# Patient Record
Sex: FEMALE | Race: WHITE | NOT HISPANIC OR LATINO | Employment: OTHER | URBAN - METROPOLITAN AREA
[De-identification: names, ages, dates, MRNs, and addresses within clinical notes are randomized per-mention and may not be internally consistent; named-entity substitution may affect disease eponyms.]

---

## 2018-05-30 ENCOUNTER — OFFICE VISIT (OUTPATIENT)
Dept: URGENT CARE | Facility: CLINIC | Age: 38
End: 2018-05-30
Payer: COMMERCIAL

## 2018-05-30 VITALS
RESPIRATION RATE: 16 BRPM | HEART RATE: 110 BPM | OXYGEN SATURATION: 98 % | SYSTOLIC BLOOD PRESSURE: 106 MMHG | TEMPERATURE: 97.6 F | DIASTOLIC BLOOD PRESSURE: 78 MMHG

## 2018-05-30 DIAGNOSIS — M54.9 DORSALGIA: ICD-10-CM

## 2018-05-30 DIAGNOSIS — R51.9 NONINTRACTABLE HEADACHE, UNSPECIFIED CHRONICITY PATTERN, UNSPECIFIED HEADACHE TYPE: ICD-10-CM

## 2018-05-30 DIAGNOSIS — N30.90 BLADDER INFECTION: ICD-10-CM

## 2018-05-30 LAB
APPEARANCE UR: NORMAL
BILIRUB UR STRIP-MCNC: NEGATIVE MG/DL
COLOR UR AUTO: YELLOW
GLUCOSE UR STRIP.AUTO-MCNC: NEGATIVE MG/DL
KETONES UR STRIP.AUTO-MCNC: NEGATIVE MG/DL
LEUKOCYTE ESTERASE UR QL STRIP.AUTO: NORMAL
NITRITE UR QL STRIP.AUTO: NEGATIVE
PH UR STRIP.AUTO: 7 [PH] (ref 5–8)
PROT UR QL STRIP: NEGATIVE MG/DL
RBC UR QL AUTO: NEGATIVE
SP GR UR STRIP.AUTO: 1.01
UROBILINOGEN UR STRIP-MCNC: NEGATIVE MG/DL

## 2018-05-30 PROCEDURE — 81002 URINALYSIS NONAUTO W/O SCOPE: CPT | Performed by: FAMILY MEDICINE

## 2018-05-30 PROCEDURE — 99000 SPECIMEN HANDLING OFFICE-LAB: CPT | Performed by: FAMILY MEDICINE

## 2018-05-30 PROCEDURE — 99214 OFFICE O/P EST MOD 30 MIN: CPT | Performed by: FAMILY MEDICINE

## 2018-05-30 RX ORDER — CEPHALEXIN 500 MG/1
500 CAPSULE ORAL 3 TIMES DAILY
Qty: 21 CAP | Refills: 0 | Status: SHIPPED | OUTPATIENT
Start: 2018-05-30 | End: 2018-06-06

## 2018-05-30 ASSESSMENT — ENCOUNTER SYMPTOMS
MYALGIAS: 1
CHILLS: 0
HEADACHES: 1
FOCAL WEAKNESS: 0
SPUTUM PRODUCTION: 0
FEVER: 0
COUGH: 0
DIZZINESS: 0
BACK PAIN: 1

## 2018-05-30 NOTE — PROGRESS NOTES
"Subjective:      Gabrielle Courtney is a 37 y.o. female who presents with Back Pain (x yesterday, pain on Rt. lower back, pain is constant and getting worse)    Chief Complaint   Patient presents with   • Back Pain     x yesterday, pain on Rt. lower back, pain is constant and getting worse        - This is a very pleasant 37 y.o. female with complaints of atraumatic constant non-radiating afebrile Rt lower back pain since yesterday. Cant think of anything that makes it better or worse. No NVFC, no rash, no limb weakness. Eating/drinking well, no urinary symptoms.     ~36 wks pregnant, + FM, no LOF/VB.           ALLERGIES:  Patient has no known allergies.     PMH:  Past Medical History:   Diagnosis Date   • Anesthesia     PONV \"always require higher dose\"   • Arthritis    • ASTHMA    • Other specified disorder of intestines     IBS   • Pain     left knee   • Personal history of venous thrombosis and embolism 2006   • Psychiatric problem     depression, anxiety   • Renal disorder     hx frequent kidney infections   • Urinary bladder disorder     frequent UTI        MEDS:    Current Outpatient Prescriptions:   •  Prenatal Vit-Fe Fumarate-FA (PRENATAL VITAMIN PO), Take  by mouth., Disp: , Rfl:   •  cephALEXin (KEFLEX) 500 MG Cap, Take 1 Cap by mouth 3 times a day for 7 days., Disp: 21 Cap, Rfl: 0  •  Butalbital-Aspirin-Caffeine (FIORINAL PO), Take  by mouth as needed., Disp: , Rfl:   •  Probiotic Product (ALIGN PO), Take  by mouth every day., Disp: , Rfl:     ** I have documented what I find to be significant in regards to past medical, social, family and surgical history  in my HPI or under PMH/PSH/FH review section, otherwise it is contributory **           HPI    Review of Systems   Constitutional: Negative for chills and fever.   Respiratory: Negative for cough and sputum production.    Musculoskeletal: Positive for back pain and myalgias.   Neurological: Positive for headaches. Negative for dizziness and focal " weakness.          Objective:     /78   Pulse (!) 110   Temp 36.4 °C (97.6 °F)   Resp 16   SpO2 98%      Physical Exam   Constitutional: She appears well-developed. No distress.   HENT:   Head: Normocephalic and atraumatic.   Mouth/Throat: Oropharynx is clear and moist.   Eyes: Conjunctivae are normal.   Neck: Neck supple.   Cardiovascular: Regular rhythm.    No murmur heard.  Pulmonary/Chest: Effort normal. No respiratory distress.   Musculoskeletal:        Arms:  Neurological: She is alert. She exhibits normal muscle tone.   Skin: Skin is warm and dry.   Psychiatric: She has a normal mood and affect. Judgment normal.   Nursing note and vitals reviewed.  Rt paraspinal region w/ TTP.             Assessment/Plan:         1. Headache     2. Dorsalgia  POCT Urinalysis    URINE CULTURE(NEW)   3. Bladder infection  cephALEXin (KEFLEX) 500 MG Cap       * suspect this is more MSK then a UTI/Pyelo. Will cover w/ abx and have asked her to f/u w/ her OB tomorrow morning, ER if getting worse.       Dx & d/c instructions discussed w/ patient and/or family members. Follow up w/ her OB in morning,  ER if worse and UC/PCP unavailable.        Possible side effects (i.e. Rash, GI upset/constipation, sedation, elevation of BP or sugars) of any medications given discussed.

## 2018-06-22 ENCOUNTER — HOSPITAL ENCOUNTER (INPATIENT)
Facility: MEDICAL CENTER | Age: 38
LOS: 2 days | End: 2018-06-24
Attending: OBSTETRICS & GYNECOLOGY | Admitting: OBSTETRICS & GYNECOLOGY
Payer: COMMERCIAL

## 2018-06-22 LAB
BASOPHILS # BLD AUTO: 0.2 % (ref 0–1.8)
BASOPHILS # BLD: 0.02 K/UL (ref 0–0.12)
EOSINOPHIL # BLD AUTO: 0.09 K/UL (ref 0–0.51)
EOSINOPHIL NFR BLD: 0.8 % (ref 0–6.9)
ERYTHROCYTE [DISTWIDTH] IN BLOOD BY AUTOMATED COUNT: 43.8 FL (ref 35.9–50)
HCT VFR BLD AUTO: 36.1 % (ref 37–47)
HGB BLD-MCNC: 11.5 G/DL (ref 12–16)
HOLDING TUBE BB 8507: NORMAL
IMM GRANULOCYTES # BLD AUTO: 0.1 K/UL (ref 0–0.11)
IMM GRANULOCYTES NFR BLD AUTO: 0.8 % (ref 0–0.9)
LYMPHOCYTES # BLD AUTO: 1.77 K/UL (ref 1–4.8)
LYMPHOCYTES NFR BLD: 14.9 % (ref 22–41)
MCH RBC QN AUTO: 25.2 PG (ref 27–33)
MCHC RBC AUTO-ENTMCNC: 31.9 G/DL (ref 33.6–35)
MCV RBC AUTO: 79.2 FL (ref 81.4–97.8)
MONOCYTES # BLD AUTO: 0.84 K/UL (ref 0–0.85)
MONOCYTES NFR BLD AUTO: 7.1 % (ref 0–13.4)
NEUTROPHILS # BLD AUTO: 9.03 K/UL (ref 2–7.15)
NEUTROPHILS NFR BLD: 76.2 % (ref 44–72)
NRBC # BLD AUTO: 0 K/UL
NRBC BLD-RTO: 0 /100 WBC
PLATELET # BLD AUTO: 192 K/UL (ref 164–446)
PMV BLD AUTO: 11.6 FL (ref 9–12.9)
RBC # BLD AUTO: 4.56 M/UL (ref 4.2–5.4)
WBC # BLD AUTO: 11.9 K/UL (ref 4.8–10.8)

## 2018-06-22 PROCEDURE — 700102 HCHG RX REV CODE 250 W/ 637 OVERRIDE(OP): Performed by: OBSTETRICS & GYNECOLOGY

## 2018-06-22 PROCEDURE — 0HQ9XZZ REPAIR PERINEUM SKIN, EXTERNAL APPROACH: ICD-10-PCS | Performed by: OBSTETRICS & GYNECOLOGY

## 2018-06-22 PROCEDURE — 700101 HCHG RX REV CODE 250

## 2018-06-22 PROCEDURE — 302128 INFUSION PUMP: Performed by: OBSTETRICS & GYNECOLOGY

## 2018-06-22 PROCEDURE — 700111 HCHG RX REV CODE 636 W/ 250 OVERRIDE (IP)

## 2018-06-22 PROCEDURE — 3E0R3GC INTRODUCTION OF OTHER THERAPEUTIC SUBSTANCE INTO SPINAL CANAL, PERCUTANEOUS APPROACH: ICD-10-PCS | Performed by: ANESTHESIOLOGY

## 2018-06-22 PROCEDURE — 3E0T3BZ INTRODUCTION OF ANESTHETIC AGENT INTO PERIPHERAL NERVES AND PLEXI, PERCUTANEOUS APPROACH: ICD-10-PCS | Performed by: ANESTHESIOLOGY

## 2018-06-22 PROCEDURE — 36415 COLL VENOUS BLD VENIPUNCTURE: CPT

## 2018-06-22 PROCEDURE — 85025 COMPLETE CBC W/AUTO DIFF WBC: CPT

## 2018-06-22 PROCEDURE — 770001 HCHG ROOM/CARE - MED/SURG/GYN PRIV*

## 2018-06-22 PROCEDURE — 303615 HCHG EPIDURAL/SPINAL ANESTHESIA FOR LABOR

## 2018-06-22 PROCEDURE — 700112 HCHG RX REV CODE 229: Performed by: OBSTETRICS & GYNECOLOGY

## 2018-06-22 PROCEDURE — A9270 NON-COVERED ITEM OR SERVICE: HCPCS | Performed by: OBSTETRICS & GYNECOLOGY

## 2018-06-22 PROCEDURE — 700111 HCHG RX REV CODE 636 W/ 250 OVERRIDE (IP): Performed by: OBSTETRICS & GYNECOLOGY

## 2018-06-22 PROCEDURE — 59409 OBSTETRICAL CARE: CPT

## 2018-06-22 PROCEDURE — 700105 HCHG RX REV CODE 258: Performed by: OBSTETRICS & GYNECOLOGY

## 2018-06-22 PROCEDURE — 304965 HCHG RECOVERY SERVICES

## 2018-06-22 RX ORDER — MAG HYDROX/ALUMINUM HYD/SIMETH 400-400-40
1 SUSPENSION, ORAL (FINAL DOSE FORM) ORAL
Status: DISCONTINUED | OUTPATIENT
Start: 2018-06-22 | End: 2018-06-24 | Stop reason: HOSPADM

## 2018-06-22 RX ORDER — SODIUM CHLORIDE, SODIUM LACTATE, POTASSIUM CHLORIDE, CALCIUM CHLORIDE 600; 310; 30; 20 MG/100ML; MG/100ML; MG/100ML; MG/100ML
INJECTION, SOLUTION INTRAVENOUS CONTINUOUS
Status: DISPENSED | OUTPATIENT
Start: 2018-06-22 | End: 2018-06-22

## 2018-06-22 RX ORDER — ROPIVACAINE HYDROCHLORIDE 2 MG/ML
INJECTION, SOLUTION EPIDURAL; INFILTRATION; PERINEURAL
Status: COMPLETED
Start: 2018-06-22 | End: 2018-06-22

## 2018-06-22 RX ORDER — VITAMIN A ACETATE, BETA CAROTENE, ASCORBIC ACID, CHOLECALCIFEROL, .ALPHA.-TOCOPHEROL ACETATE, DL-, THIAMINE MONONITRATE, RIBOFLAVIN, NIACINAMIDE, PYRIDOXINE HYDROCHLORIDE, FOLIC ACID, CYANOCOBALAMIN, CALCIUM CARBONATE, FERROUS FUMARATE, ZINC OXIDE, CUPRIC OXIDE 3080; 12; 120; 400; 1; 1.84; 3; 20; 22; 920; 25; 200; 27; 10; 2 [IU]/1; UG/1; MG/1; [IU]/1; MG/1; MG/1; MG/1; MG/1; MG/1; [IU]/1; MG/1; MG/1; MG/1; MG/1; MG/1
1 TABLET, FILM COATED ORAL EVERY MORNING
Status: DISCONTINUED | OUTPATIENT
Start: 2018-06-23 | End: 2018-06-24 | Stop reason: HOSPADM

## 2018-06-22 RX ORDER — ONDANSETRON 4 MG/1
4 TABLET, ORALLY DISINTEGRATING ORAL EVERY 6 HOURS PRN
Status: DISCONTINUED | OUTPATIENT
Start: 2018-06-22 | End: 2018-06-24 | Stop reason: HOSPADM

## 2018-06-22 RX ORDER — DOCUSATE SODIUM 100 MG/1
100 CAPSULE, LIQUID FILLED ORAL 2 TIMES DAILY PRN
Status: DISCONTINUED | OUTPATIENT
Start: 2018-06-22 | End: 2018-06-24 | Stop reason: HOSPADM

## 2018-06-22 RX ORDER — DEXTROSE, SODIUM CHLORIDE, SODIUM LACTATE, POTASSIUM CHLORIDE, AND CALCIUM CHLORIDE 5; .6; .31; .03; .02 G/100ML; G/100ML; G/100ML; G/100ML; G/100ML
INJECTION, SOLUTION INTRAVENOUS CONTINUOUS
Status: DISCONTINUED | OUTPATIENT
Start: 2018-06-22 | End: 2018-06-22 | Stop reason: HOSPADM

## 2018-06-22 RX ORDER — METHYLERGONOVINE MALEATE 0.2 MG/ML
0.2 INJECTION INTRAVENOUS
Status: DISCONTINUED | OUTPATIENT
Start: 2018-06-22 | End: 2018-06-24 | Stop reason: HOSPADM

## 2018-06-22 RX ORDER — CITRIC ACID/SODIUM CITRATE 334-500MG
30 SOLUTION, ORAL ORAL EVERY 6 HOURS PRN
Status: DISCONTINUED | OUTPATIENT
Start: 2018-06-22 | End: 2018-06-22 | Stop reason: HOSPADM

## 2018-06-22 RX ORDER — ONDANSETRON 2 MG/ML
4 INJECTION INTRAMUSCULAR; INTRAVENOUS EVERY 6 HOURS PRN
Status: DISCONTINUED | OUTPATIENT
Start: 2018-06-22 | End: 2018-06-24 | Stop reason: HOSPADM

## 2018-06-22 RX ORDER — ACETAMINOPHEN 325 MG/1
325 TABLET ORAL EVERY 4 HOURS PRN
Status: DISCONTINUED | OUTPATIENT
Start: 2018-06-22 | End: 2018-06-24 | Stop reason: HOSPADM

## 2018-06-22 RX ORDER — BISACODYL 10 MG
10 SUPPOSITORY, RECTAL RECTAL PRN
Status: DISCONTINUED | OUTPATIENT
Start: 2018-06-22 | End: 2018-06-24 | Stop reason: HOSPADM

## 2018-06-22 RX ORDER — SODIUM CHLORIDE, SODIUM LACTATE, POTASSIUM CHLORIDE, CALCIUM CHLORIDE 600; 310; 30; 20 MG/100ML; MG/100ML; MG/100ML; MG/100ML
INJECTION, SOLUTION INTRAVENOUS PRN
Status: DISCONTINUED | OUTPATIENT
Start: 2018-06-22 | End: 2018-06-24 | Stop reason: HOSPADM

## 2018-06-22 RX ORDER — HYDROCODONE BITARTRATE AND ACETAMINOPHEN 5; 325 MG/1; MG/1
1 TABLET ORAL EVERY 4 HOURS PRN
Status: DISCONTINUED | OUTPATIENT
Start: 2018-06-22 | End: 2018-06-24 | Stop reason: HOSPADM

## 2018-06-22 RX ORDER — IBUPROFEN 600 MG/1
600 TABLET ORAL EVERY 6 HOURS PRN
Status: DISCONTINUED | OUTPATIENT
Start: 2018-06-22 | End: 2018-06-24 | Stop reason: HOSPADM

## 2018-06-22 RX ORDER — MISOPROSTOL 200 UG/1
800 TABLET ORAL
Status: COMPLETED | OUTPATIENT
Start: 2018-06-22 | End: 2018-06-22

## 2018-06-22 RX ORDER — MISOPROSTOL 200 UG/1
600 TABLET ORAL
Status: DISCONTINUED | OUTPATIENT
Start: 2018-06-22 | End: 2018-06-24 | Stop reason: HOSPADM

## 2018-06-22 RX ORDER — HYDROCODONE BITARTRATE AND ACETAMINOPHEN 10; 325 MG/1; MG/1
1 TABLET ORAL EVERY 4 HOURS PRN
Status: DISCONTINUED | OUTPATIENT
Start: 2018-06-22 | End: 2018-06-24 | Stop reason: HOSPADM

## 2018-06-22 RX ORDER — METHYLERGONOVINE MALEATE 0.2 MG/ML
INJECTION INTRAVENOUS
Status: ACTIVE
Start: 2018-06-22 | End: 2018-06-23

## 2018-06-22 RX ORDER — CARBOPROST TROMETHAMINE 250 UG/ML
250 INJECTION, SOLUTION INTRAMUSCULAR
Status: DISCONTINUED | OUTPATIENT
Start: 2018-06-22 | End: 2018-06-24 | Stop reason: HOSPADM

## 2018-06-22 RX ADMIN — OXYTOCIN 1 MILLI-UNITS/MIN: 10 INJECTION, SOLUTION INTRAMUSCULAR; INTRAVENOUS at 17:35

## 2018-06-22 RX ADMIN — SODIUM CHLORIDE, POTASSIUM CHLORIDE, SODIUM LACTATE AND CALCIUM CHLORIDE: 600; 310; 30; 20 INJECTION, SOLUTION INTRAVENOUS at 10:09

## 2018-06-22 RX ADMIN — ROPIVACAINE HYDROCHLORIDE 100 ML: 2 INJECTION, SOLUTION EPIDURAL; INFILTRATION at 10:42

## 2018-06-22 RX ADMIN — SODIUM CHLORIDE, POTASSIUM CHLORIDE, SODIUM LACTATE AND CALCIUM CHLORIDE: 600; 310; 30; 20 INJECTION, SOLUTION INTRAVENOUS at 09:20

## 2018-06-22 RX ADMIN — IBUPROFEN 600 MG: 600 TABLET, FILM COATED ORAL at 22:50

## 2018-06-22 RX ADMIN — OXYTOCIN 125 ML/HR: 10 INJECTION, SOLUTION INTRAMUSCULAR; INTRAVENOUS at 20:14

## 2018-06-22 RX ADMIN — ENOXAPARIN SODIUM 40 MG: 100 INJECTION SUBCUTANEOUS at 22:51

## 2018-06-22 RX ADMIN — FENTANYL CITRATE 100 MCG: 50 INJECTION INTRAMUSCULAR; INTRAVENOUS at 14:30

## 2018-06-22 RX ADMIN — SODIUM CHLORIDE, POTASSIUM CHLORIDE, SODIUM LACTATE AND CALCIUM CHLORIDE 125 ML: 600; 310; 30; 20 INJECTION, SOLUTION INTRAVENOUS at 15:53

## 2018-06-22 RX ADMIN — FENTANYL CITRATE 100 MCG: 50 INJECTION INTRAMUSCULAR; INTRAVENOUS at 19:30

## 2018-06-22 RX ADMIN — MISOPROSTOL 800 MCG: 200 TABLET ORAL at 19:30

## 2018-06-22 RX ADMIN — HYDROCODONE BITARTRATE AND ACETAMINOPHEN 1 TABLET: 5; 325 TABLET ORAL at 20:12

## 2018-06-22 RX ADMIN — DOCUSATE SODIUM 100 MG: 100 CAPSULE ORAL at 22:50

## 2018-06-22 RX ADMIN — SODIUM CHLORIDE, POTASSIUM CHLORIDE, SODIUM LACTATE AND CALCIUM CHLORIDE: 600; 310; 30; 20 INJECTION, SOLUTION INTRAVENOUS at 12:09

## 2018-06-22 ASSESSMENT — PATIENT HEALTH QUESTIONNAIRE - PHQ9
2. FEELING DOWN, DEPRESSED, IRRITABLE, OR HOPELESS: NOT AT ALL
1. LITTLE INTEREST OR PLEASURE IN DOING THINGS: NOT AT ALL
SUM OF ALL RESPONSES TO PHQ9 QUESTIONS 1 AND 2: 0

## 2018-06-22 ASSESSMENT — LIFESTYLE VARIABLES: EVER_SMOKED: NEVER

## 2018-06-22 ASSESSMENT — PAIN SCALES - GENERAL: PAINLEVEL_OUTOF10: 8

## 2018-06-22 NOTE — PROGRESS NOTES
"S: Epidural not working well on left side. Additionally had a wet tap. HA better but still present    O: Blood pressure (!) 98/56, pulse 78, temperature 36.8 °C (98.3 °F), temperature source Oral, height 1.702 m (5' 7\"), weight 93.9 kg (207 lb), last menstrual period 2017, unknown if currently breastfeeding.  Gen: NAD  SVE: deferred  [place]: CTX q3-6  FHT: BAseline 125 with moderate LTV. +accels. No variables/decels    Labs: Hct 36, Plt 192    A/P 36yo  @ 39w, prodromal labor, hx of prophylactic anticoagulation for hx of DVT  1. Labor: Remains latent. Continue expectant management. AROM deferred until epidural adjusted   2. Cat I  3. Pain: Anesthesia plans to replace the epidural. Discussed blood patch for spinal HA after delivered, though efficacy may be less given need for anticoagulation  4. h/o DVT: Lovenox held. SCDs applied   "

## 2018-06-22 NOTE — H&P
Department of Obstetrics and Gynecology  Labor and Delivery History and Physical    Date of Admission: 2018      ID: 37 y.o.  with IUP at 39w0d     Primary OB: Andres Wilson M.D.    Attending OB: Ty Peguero M.D.    CC: CTX    HPI: Gabrielle Courtney is a 37 y.o.  at 39w0d by LMP c/w 8 week ultrasound, who presents with CTX that began at 0000.  She states she was 2 cm dilated in the office on Tuesday.  She denies leaking of fluid, vaginal bleeding.  She endorses good fetal movement.  She was otherwise feeling well.  She does note that she forgot to take her anticoagulation (40 mg Lovenox subcu daily) the last 2 nights.  Her last dose was 2019 in the evening.    Current pregnancy has been complicated by her prior history of provoked thrombosis requiring anticoagulation during this pregnancy.  The fetus was also diagnosed with a small apical muscular VSD.  It appeared to be decreasing in size on follow-up ultrasounds.  A  echocardiogram as recommended by maternal fetal medicine.    ROS: 10 systems reviewed and negative except as noted above.    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  Denies Hx  "of STIs, specifically denies history of HSV for patient and her partner  Tobacco: Denies  EtOH: Infrequent social user prior to pregnancy, none during pregnancy  Street Drugs: Denies  Works as a        Medications  Allergies   No current facility-administered medications on file prior to encounter.      Current Outpatient Prescriptions on File Prior to Encounter   Medication Sig Dispense Refill   • Butalbital-Aspirin-Caffeine (FIORINAL PO) Take  by mouth as needed.     • Probiotic Product (ALIGN PO) Take  by mouth every day.     PNV No Known Allergies       O: /75   Pulse 99   Temp 36.7 °C (98 °F) (Temporal)   Ht 1.702 m (5' 7\")   Wt 93.9 kg (207 lb)   LMP 2017   Breastfeeding? Unknown   BMI 32.42 kg/m²       Gen: NAD, AAO  Abd: Gravid, NTTP,Cephalic by [place], No rebound or guarding  Ext: NTTP, trace edema, 2+DPP  Pelvic: SVE 2-3/50/-2, posterior x2    FHT:  120/mod gerald/+accels, -decels  [place]: CTX q2-3min    Labs:   Pending    Prenatal labs:  Rh+, ABS negative, RubImm, HBsAg, HIV NR, RPR NR.  UCx no growth.  GC/CT neg/neg.  NIPS aneuploidy screen negative.  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  1h glucose 77.  GBS negative.         A/P: Gabrielle Courtney is a  at 39w0d by LMP c/w 8wk U/S who presents with CTX.  Hx of ovarian vein clot post delivery, was on PPX anticoagulation until 3d ago.  AVSS.  Cat I FHT.  *Admit to L&D  *IV, CBC, T&S on hold  *Labor: CTX are regular and uncomfortable for the patient, but has not changed.  As she is 39wk and off of her anticoagulation already >24h and is desiring moving forward with delivery, we will move forward with augmentation with oxytocin mostly for coordination of anticoagulation.  Plan AROM later. I have discussed this with Dr. Angel who is taking over call now from me.    - Oxytocin per protocol  *History of ovarian vein thrombosis: The patient has been on prophylactic Lovenox " "anticoagulation through the pregnancy.  Her last dose was evening of 2018, because she forgot the last 2 nights.  As she is now off of anticoagulation and she should be ready for neuraxial analgesia as needed.     - Lovenox 40 mg daily for 6 weeks postpartum.     - SCDs for thromboprophylaxis  *Fetal apical VSD: MFM diagnosed a small apical muscular VSD.  From their documentation and appears that it was getting smaller over the last few ultrasounds.  A  echocardiogram was recommended.  Dr. Rodríguez states that the \"VSD is sufficiently small as to not be hemodynamically significant.\"   -  echocardiogram  *FWB: Reassuring, reactive, Cat I FHT.  CEFM.  *Pain: desires epidural when appropriate. Discussed with Dr. Raygoza given hx of anticoagulation during pregnancy, but has been >24h since last dose of lovenox.  *Global: Rh+, RubImm, GBS neg.    - Clears    Signed out to Dr. Stanley Peguero MD, MS,  2018, 8:01 AM      "

## 2018-06-22 NOTE — PROGRESS NOTES
0700- Report received from PRISCILA Atwood RN. Pt educated on POC to have MD assess pt for admission.  Pt reports +FM. Pt reports uncomfortable UC's. Pt denies LOF or VB.  0800- pt being admitted to L&D for UC's.  0920- Dr. Raygoza at the bedside for IV placement, IV placed by US after 5 attempts.   1020- Dr. Raygoza at the bedside, timeout complete, epidural placed, wet tap. Pt has HA.  1105- hinson placed, SVE 2-3/thick/ballot, posterior, firm, PRISCILA Pickens, NANCY.  1145- Dr. Raygoza at the bedside, bolus administered.  1210- Dr. Raygoza at the bedside, bolus administered.  1400- epidural replaced  1430- 100mg fentanyl administered for HA to replace epidural.   1440-epidural replaced  1503- Dr. Raygoza at the bedside, bolus administered.  1513- 10mcg ephedrine, 100 phenylephrine, administered by Dr. Raygoza.  1530- 10mg ephedrine, 100 phenylephrine, administered by Dr. Raygoza.  1614-  Working at the bedside, SVE 4/70/-3, AROM, clr, IUPC placed.  1725- pt feeling pain in lower abdomen, SVE 4/70/-2, PRISCILA Pickens, NANCY. Dr. Angel updated on labor status, orders to start pitocin. Dr. Raygoza updated on pain status, order to increase epidural rate from 10 to 14.  1850- SVE 6-7/80/-1, PRISCILA Pickens RN. Report given to JUAN DANIEL Mcfadden RN.

## 2018-06-22 NOTE — PROGRESS NOTES
37 y.o. , EDC  (39.0)    Pt presents to L&D c/o painful UCs that started at 0000. States she was 2 cm in the office on Tues. Takes lovenox for a hx of a DVT after her last delivery 11 years ago, last took lovenox 2 days ago - states she forgot to take it. SVE=2-350/-2. GBS neg. Dr Peguero phoned, order to recheck cervix in an hour.     SVE after an hour=unchanged. Pt appears uncomfortable w/ UCs. Dr Peguero updated. Will recheck in an hour. Pt up to walk

## 2018-06-23 LAB
ERYTHROCYTE [DISTWIDTH] IN BLOOD BY AUTOMATED COUNT: 42.3 FL (ref 35.9–50)
HCT VFR BLD AUTO: 29.1 % (ref 37–47)
HGB BLD-MCNC: 9.5 G/DL (ref 12–16)
MCH RBC QN AUTO: 25.5 PG (ref 27–33)
MCHC RBC AUTO-ENTMCNC: 32.6 G/DL (ref 33.6–35)
MCV RBC AUTO: 78.2 FL (ref 81.4–97.8)
PLATELET # BLD AUTO: 151 K/UL (ref 164–446)
PMV BLD AUTO: 11.4 FL (ref 9–12.9)
RBC # BLD AUTO: 3.72 M/UL (ref 4.2–5.4)
WBC # BLD AUTO: 13.2 K/UL (ref 4.8–10.8)

## 2018-06-23 PROCEDURE — 770001 HCHG ROOM/CARE - MED/SURG/GYN PRIV*

## 2018-06-23 PROCEDURE — A9270 NON-COVERED ITEM OR SERVICE: HCPCS | Performed by: OBSTETRICS & GYNECOLOGY

## 2018-06-23 PROCEDURE — 700111 HCHG RX REV CODE 636 W/ 250 OVERRIDE (IP): Performed by: OBSTETRICS & GYNECOLOGY

## 2018-06-23 PROCEDURE — 85027 COMPLETE CBC AUTOMATED: CPT

## 2018-06-23 PROCEDURE — 700102 HCHG RX REV CODE 250 W/ 637 OVERRIDE(OP): Performed by: OBSTETRICS & GYNECOLOGY

## 2018-06-23 PROCEDURE — 700112 HCHG RX REV CODE 229: Performed by: OBSTETRICS & GYNECOLOGY

## 2018-06-23 PROCEDURE — 36415 COLL VENOUS BLD VENIPUNCTURE: CPT

## 2018-06-23 RX ORDER — COSYNTROPIN 0.25 MG/ML
1 INJECTION, POWDER, FOR SOLUTION INTRAMUSCULAR; INTRAVENOUS ONCE
Status: COMPLETED | OUTPATIENT
Start: 2018-06-23 | End: 2018-06-23

## 2018-06-23 RX ADMIN — HYDROCODONE BITARTRATE AND ACETAMINOPHEN 1 TABLET: 10; 325 TABLET ORAL at 08:14

## 2018-06-23 RX ADMIN — HYDROCODONE BITARTRATE AND ACETAMINOPHEN 1 TABLET: 10; 325 TABLET ORAL at 22:21

## 2018-06-23 RX ADMIN — DOCUSATE SODIUM 100 MG: 100 CAPSULE ORAL at 19:41

## 2018-06-23 RX ADMIN — IBUPROFEN 600 MG: 600 TABLET, FILM COATED ORAL at 19:41

## 2018-06-23 RX ADMIN — HYDROCODONE BITARTRATE AND ACETAMINOPHEN 1 TABLET: 10; 325 TABLET ORAL at 04:16

## 2018-06-23 RX ADMIN — Medication 1 TABLET: at 08:14

## 2018-06-23 RX ADMIN — IBUPROFEN 600 MG: 600 TABLET, FILM COATED ORAL at 08:22

## 2018-06-23 RX ADMIN — HYDROCODONE BITARTRATE AND ACETAMINOPHEN 1 TABLET: 10; 325 TABLET ORAL at 12:09

## 2018-06-23 RX ADMIN — HYDROCODONE BITARTRATE AND ACETAMINOPHEN 1 TABLET: 10; 325 TABLET ORAL at 00:05

## 2018-06-23 RX ADMIN — HYDROCODONE BITARTRATE AND ACETAMINOPHEN 1 TABLET: 10; 325 TABLET ORAL at 17:05

## 2018-06-23 RX ADMIN — COSYNTROPIN 1000 MCG: 0.25 INJECTION, POWDER, LYOPHILIZED, FOR SOLUTION INTRAVENOUS at 10:01

## 2018-06-23 ASSESSMENT — PAIN SCALES - GENERAL
PAINLEVEL_OUTOF10: 7
PAINLEVEL_OUTOF10: 5
PAINLEVEL_OUTOF10: 6
PAINLEVEL_OUTOF10: 7
PAINLEVEL_OUTOF10: 3
PAINLEVEL_OUTOF10: 8

## 2018-06-23 NOTE — CARE PLAN
Problem: Communication  Goal: The ability to communicate needs accurately and effectively will improve  Outcome: PROGRESSING AS EXPECTED  Patient ambulating to bathroom, taking adequate PO fluids and voiding. No c/o of headache, continue to be on sequentials. Breast feeding infant. 2nd bag of pitocin done.     Problem: Pain Management  Goal: Pain level will decrease to patient's comfort goal  Outcome: PROGRESSING AS EXPECTED  Patient likes to keep up on PRN pain medication as scheduled.

## 2018-06-23 NOTE — DELIVERY NOTE
DATE OF SERVICE:  2018    PREOPERATIVE DIAGNOSES:  1.  A 37-year-old  5, para 4 at 39 weeks.  2.  Prodromal labor.  3.  History of prophylactic anticoagulation secondary to prior history of deep   venous thrombosis.    POSTOPERATIVE DIAGNOSES:  1.  A 37-year-old  5, para 4 at 39 weeks.  2.  Prodromal labor.  3.  History of prophylactic anticoagulation secondary to prior history of deep   venous thrombosis.    PROCEDURE:  1.  Normal spontaneous vaginal delivery.  2.  Unsuccessful epidural pain management.  3.  Spinal headache secondary to wet tap.    PRIMARY OBSTETRICIAN:  Dr. Wilson.    DELIVERING OBSTETRICIAN:  Dr. Angel.    HOSPITAL COURSE:  The patient is a 37-year-old  5, para 4 who presented   in latent prodromal labor at 39 weeks and 4 days.  She had a history of   anticoagulation prophylactically for DVT, which had been held.  Based on this,   I opted to keep her for pain management an epidural, and induction of labor.    She underwent a total of 3 epidural placements, one of which included a wet   tap.  Unfortunately, her epidural was never successfully controlled her pain.    She underwent amniotomy which revealed clear fluid and was augmented with   Pitocin and progressed to complete.  She pushed well to deliver a baby boy OA   over an intact perineum.  A loose nuchal cord was reduced, anterior and   posterior shoulders delivered easily and the baby was placed on maternal   stomach where he was immediately vigorous.  Cord was allowed to pulsate for 30   seconds at which point it was clamped and cut.  Placenta delivered easily and   intact under gentle manual traction.  Initially, there was some uterine atony   which resolved with fundal massage and Pitocin.  Some trailing membranes were   removed.  An 800 of Cytotec was placed.  She had a first-degree perineal   laceration, which was repaired in the usual fashion with 3-0 Vicryl.    FINDINGS:  1.  Baby boy at 1916 hours, Apgars 8  and 9, weight 7 pounds 15 ounces.  2.  Normal placenta with 3-vessel cord.  3.  Clear amniotic fluid.    COUNTS:  Correct.    COMPLICATIONS:  Unsuccessful epidural placement and spinal headache.    ESTIMATED BLOOD LOSS:  500 mL.    DISPOSITION:  Stable for routine postoperative care.  Patient will be   restarted on her Lovenox approximately 2 hours following her blood patch per   the recommendations of anesthesiologist.       ____________________________________     MD MARCEL DELUCA / SELENE    DD:  06/22/2018 20:48:31  DT:  06/22/2018 21:30:11    D#:  [id number]  Job#:  [id number]

## 2018-06-23 NOTE — PROGRESS NOTES
"S: Absolutely miserable. Has HA and epidural not working    O: Blood pressure (!) 92/54, pulse 89, temperature 36.9 °C (98.5 °F), temperature source Oral, resp. rate 18, height 1.702 m (5' 7\"), weight 93.9 kg (207 lb), last menstrual period 2017, unknown if currently breastfeeding.  Gen: NAD  SVE: complete  [place]: CTX q3  FHT: 120 with moderate LTV. +accels. Occ variables    A/P 36yo  @ 39w, prodromal labor, hx of prophylactic anticoagulation for hx of DVT  1. Labor: Will start pushing  2. Cat I  3. Pain:  -Epidural not working. Third epidural placed. Just need to proceed with delivery.     -Will address spinal HA after delivery     4. h/o DVT: Lovenox held. SCDs applied   "

## 2018-06-23 NOTE — PROGRESS NOTES
-Bedside report received from NANCY Pickens. Pt with peanut ball in and turned on her right side. Epidural in place and not working well. Pt received a wet tap and will need a blood patch after delivery. Hannah in place and draining clear yellow urine. LR running at 125 ml/hr and Pitocin at 3 hernandez-units/min. EFM and IUPC in place and reading well. FOB at bedside.   - Working at bedside to assess pt. SVE complete. Orders to begin pushing with pt. Pt educated on pushing technique and positioned into stirups.   - Began pushing with pt.   - Working at bedside to attend delivery.   - of viable male with apgars 8/9.   -Placenta delivered. Pitocin bolus started.  -800 mcg Cytotec given per Dr Angel for increased bleeding. IV Fentanyl given for pt's pain/discomfort.   -Dr Raygoza at bedside to place blood patch. Orders for her to lay flat for an hr. Pt verbalized understanding. Dr Raygoza states that her anticoagulation therapy can resume 2 hr post blood patch placement. Dr Angel made aware. Fundus firm one below U with mod-light lochia rubra (see flowsheet). Ice packs applied, pad changed.    -SCD's in place and on.

## 2018-06-23 NOTE — PROGRESS NOTES
Assumed pt care at 0715.  Received report from adelso RN.  Assessment completed.  Pt AAOx4.  Pt complains of HA.  MD aware.  Medicated per MAR.  call light within reach and staff numbers provided.  Pt needs met at this time.

## 2018-06-23 NOTE — PROGRESS NOTES
@ 1916   Baby Boy  Apgars 8/9  Weight [id number]  1st degree lac with repair  EBL 500cc  Cytotec 800mcg  Nany Working

## 2018-06-23 NOTE — PROGRESS NOTES
Report received from NANCY Eugene. Assessment done, Vitals stable, patient oriented to room & skylight. Call light placed within reach, patient educated in regards to safety of baby and herself. Patient instructed to call before ambulating. All questions answered.

## 2018-06-23 NOTE — PROGRESS NOTES
"PPD#1    S: Mild cramping. Normal Lochia. Back hurts from multiple epidural attempts. HA better but still present constantly and worse with standing.     O: Blood pressure (!) 99/59, pulse 88, temperature 36.7 °C (98.1 °F), resp. rate 17, height 1.702 m (5' 7\"), weight 93.9 kg (207 lb), last menstrual period 2017, SpO2 95 %, currently breastfeeding.  Gen: NAD  Fundus firm below umbilicus  Ext: no c/c/e    Labs: RH+, RI, GBS neg, Hct 36-->29    A/P 36yo  s/p , spinal ha, prior hx of ovarian DVT  1. Routine pp care  2. Spinal HA:  -received blood patch immediately after delivery    -Corsytropin per Anesthesia. She will also come by to see her.   3. h/o DVT -Prophylactic lovenox has been resumed  4. Dispo: home tomorrow   "

## 2018-06-24 VITALS
DIASTOLIC BLOOD PRESSURE: 77 MMHG | HEART RATE: 79 BPM | OXYGEN SATURATION: 95 % | SYSTOLIC BLOOD PRESSURE: 101 MMHG | BODY MASS INDEX: 32.49 KG/M2 | RESPIRATION RATE: 16 BRPM | TEMPERATURE: 97 F | HEIGHT: 67 IN | WEIGHT: 207 LBS

## 2018-06-24 PROCEDURE — 700102 HCHG RX REV CODE 250 W/ 637 OVERRIDE(OP): Performed by: OBSTETRICS & GYNECOLOGY

## 2018-06-24 PROCEDURE — 700111 HCHG RX REV CODE 636 W/ 250 OVERRIDE (IP): Performed by: OBSTETRICS & GYNECOLOGY

## 2018-06-24 PROCEDURE — A9270 NON-COVERED ITEM OR SERVICE: HCPCS | Performed by: OBSTETRICS & GYNECOLOGY

## 2018-06-24 RX ORDER — FERROUS SULFATE 325(65) MG
325 TABLET ORAL
Status: DISCONTINUED | OUTPATIENT
Start: 2018-06-24 | End: 2018-06-24 | Stop reason: HOSPADM

## 2018-06-24 RX ORDER — HYDROCODONE BITARTRATE AND ACETAMINOPHEN 5; 325 MG/1; MG/1
1 TABLET ORAL EVERY 4 HOURS PRN
Qty: 20 TAB | Refills: 0 | Status: SHIPPED | OUTPATIENT
Start: 2018-06-24 | End: 2018-07-01

## 2018-06-24 RX ORDER — IBUPROFEN 600 MG/1
600 TABLET ORAL EVERY 6 HOURS PRN
Qty: 30 TAB | Refills: 1 | Status: SHIPPED | OUTPATIENT
Start: 2018-06-24

## 2018-06-24 RX ADMIN — HYDROCODONE BITARTRATE AND ACETAMINOPHEN 1 TABLET: 10; 325 TABLET ORAL at 02:10

## 2018-06-24 RX ADMIN — Medication 1 TABLET: at 09:06

## 2018-06-24 RX ADMIN — HYDROCODONE BITARTRATE AND ACETAMINOPHEN 1 TABLET: 10; 325 TABLET ORAL at 11:05

## 2018-06-24 RX ADMIN — IBUPROFEN 600 MG: 600 TABLET, FILM COATED ORAL at 09:06

## 2018-06-24 RX ADMIN — IBUPROFEN 600 MG: 600 TABLET, FILM COATED ORAL at 02:09

## 2018-06-24 RX ADMIN — HYDROCODONE BITARTRATE AND ACETAMINOPHEN 1 TABLET: 10; 325 TABLET ORAL at 06:29

## 2018-06-24 RX ADMIN — Medication 325 MG: at 09:07

## 2018-06-24 RX ADMIN — ENOXAPARIN SODIUM 40 MG: 100 INJECTION SUBCUTANEOUS at 09:07

## 2018-06-24 ASSESSMENT — PAIN SCALES - GENERAL
PAINLEVEL_OUTOF10: 4
PAINLEVEL_OUTOF10: 6
PAINLEVEL_OUTOF10: 7
PAINLEVEL_OUTOF10: 7
PAINLEVEL_OUTOF10: 2
PAINLEVEL_OUTOF10: 7

## 2018-06-24 ASSESSMENT — PATIENT HEALTH QUESTIONNAIRE - PHQ9
SUM OF ALL RESPONSES TO PHQ9 QUESTIONS 1 AND 2: 0
1. LITTLE INTEREST OR PLEASURE IN DOING THINGS: NOT AT ALL
2. FEELING DOWN, DEPRESSED, IRRITABLE, OR HOPELESS: NOT AT ALL

## 2018-06-24 NOTE — CARE PLAN
Problem: Alteration in comfort related to episiotomy, vaginal repair and/or after birth pains  Goal: Patient is able to ambulate, care for self and infant  Patient is able to ambulate    Problem: Potential knowledge deficit related to lack of understanding of self and  care  Goal: Patient will verbalize understanding of self and infant care  Patient verbalizes understanding to care for self and infant.

## 2018-06-24 NOTE — CARE PLAN
Problem: Communication  Goal: The ability to communicate needs accurately and effectively will improve  Outcome: PROGRESSING AS EXPECTED  Patient reported head-ache not as severe after the procedure (Sphenopalatine ganglion block) Dr. Irizarry performed at change of shift. Continue to monitor patient.     Patient requested formula for infant, instructions and similac given, pt. Verbalized understanding. All questions and concerns answered.     Problem: Pain Management  Goal: Pain level will decrease to patient's comfort goal  Outcome: PROGRESSING AS EXPECTED  Patient would like to keep up on PRN pain medication as scheduled.

## 2018-06-24 NOTE — DISCHARGE INSTRUCTIONS
POSTPARTUM DISCHARGE INSTRUCTIONS FOR MOM    YOB: 1980   Age: 37 y.o.               Admit Date: 2018     Discharge Date: 2018  Attending Doctor:  Andres Wilson M.D.                  Allergies:  Patient has no known allergies.    Discharged to home by car. Discharged via wheelchair, hospital escort: Yes.  Special equipment needed: Not Applicable  Belongings with: Personal  Be sure to schedule a follow-up appointment with your primary care doctor or any specialists as instructed.     Discharge Plan:   Diet Plan: Discussed  Confirmed Follow up Appointment: Patient to Call and Schedule Appointment  Confirmed Symptoms Management: Discussed  Medication Reconciliation Updated: No (Comments)  Influenza Vaccine Indication: Indicated: Not available from distributor/    REASONS TO CALL YOUR OBSTETRICIAN:  1.   Persistent fever or shaking chills (Temperature higher than 100.4)  2.   Heavy bleeding (soaking more than 1 pad per hour); Passing clots  3.   Foul odor from vagina  4.   Mastitis (Breast infection; breast pain, chills, fever, redness)  5.   Urinary pain, burning or frequency  6.   Episiotomy infection  7.   Abdominal incision infection  8.   Severe depression longer than 24 hours    HAND WASHING  · Prior to handling the baby.  · Before breastfeeding or bottle feeding baby.  · After using the bathroom or changing the baby's diaper.    WOUND CARE  Ask your physician for additional care instructions.  In general:    ·  Incision:      · Keep clean and dry.    · Do NOT lift anything heavier than your baby for up to 6 weeks.    · There should not be any opening or pus.      VAGINAL CARE  · Nothing inside vagina for 6 weeks: no sexual intercourse, tampons or douching.  · Bleeding may continue for 2-4 weeks.  Amount may vary.    · Call your physician for heavy bleeding which means soaking more than 1 pad per hour    BIRTH CONTROL  · It is possible to become pregnant at any time  "after delivery and while breastfeeding.  · Plan to discuss a method of birth control with your physician at your follow up visit. visit.    DIET AND ELIMINATION  · Eating more fiber (bran cereal, fruits, and vegetables) and drinking plenty of fluids will help to avoid constipation.  · Urinary frequency after childbirth is normal.    POSTPARTUM BLUES  During the first few days after birth, you may experience a sense of the \"blues\" which may include impatience, irritability or even crying.  These feeling come and go quickly.  However, as many as 1 in 10 women experience emotional symptoms known as postpartum depression.    Postpartum depression:  May start as early as the second or third day after delivery or take several weeks or months to develop.  Symptoms of \"blues\" are present, but are more intense:  Crying spells; loss of appetite; feelings of hopelessness or loss of control; fear of touching the baby; over concern or no concern at all about the baby; little or no concern about your own appearance/caring for yourself; and/or inability to sleep or excessive sleeping.  Contact your physician if you are experiencing any of these symptoms.    Crisis Hotline:  · [place] Crisis Hotline:  7-300-YDYZXXX  Or 1-919.301.2474  · [place] Crisis Hotline:  1-529.616.3537  Or 778-568-7354    PREVENTING SHAKEN BABY:  If you are angry or stressed, PUT THE BABY IN THE CRIB, step away, take some deep breaths, and wait until you are calm to care for the baby.  DO NOT SHAKE THE BABY.  You are not alone, call a supporter for help.    · Crisis [facility] Center 24/7 crisis line 328-815-5088 or 1-544.846.2199  · You can also text them, text \"ANSWER\" to 180581    QUIT SMOKING/TOBACCO USE:  I understand the use of any tobacco products increases my chance of suffering from future heart disease and could cause other illnesses which may shorten my life. Quitting the use of tobacco products is the single most important thing I can do to improve my " health. For further information on smoking / tobacco cessation call a Toll Free Quit Line at 1-892.701.1505 (*[employer] [place]) or 1-375.792.2996 (American [employer] Association) or you can access the web based program at www.lungusa.org.    · [place] Tobacco Users Help Line:  (939) 788-2693       Toll Free: 1-383.419.9865  · Quit Tobacco Program [facility] (746)015-7650    DEPRESSION / SUICIDE RISK:  As you are discharged from this [facility], it is important to learn how to keep safe from harming yourself.    Recognize the warning signs:  · Abrupt changes in personality, positive or negative- including increase in energy   · Giving away possessions  · Change in eating patterns- significant weight changes-  positive or negative  · Change in sleeping patterns- unable to sleep or sleeping all the time   · Unwillingness or inability to communicate  · Depression  · Unusual sadness, discouragement and loneliness  · Talk of wanting to die  · Neglect of personal appearance   · Rebelliousness- reckless behavior  · Withdrawal from people/activities they love  · Confusion- inability to concentrate     If you or a loved one observes any of these behaviors or has concerns about self-harm, here's what you can do:  · Talk about it- your feelings and reasons for harming yourself  · Remove any means that you might use to hurt yourself (examples: pills, rope, extension cords, firearm)  · Get professional help from the community (Mental Health, Substance Abuse, psychological counseling)  · Do not be alone:Call your Safe Contact- someone whom you trust who will be there for you.  · Call your local CRISIS HOTLINE 929-7821 or 762-595-3026  · Call your local Children's Mobile Crisis Response Team [place] (039) 442-9488 or www.[employer]  · Call the toll free National Suicide Prevention Hotlines   · National Suicide Prevention Lifeline 355-191-AGVV (7500)  · National Hope Line Network  800-SUICIDE (641-5958)    DISCHARGE SURVEY:  Thank you for choosing [facility].  We hope we provided you with very good care.  You may be receiving a survey in the mail.  Please fill it out.  Your opinion is valuable to us.    ADDITIONAL EDUCATIONAL MATERIALS GIVEN TO PATIENT:        My signature on this form indicates that:  1.  I have reviewed and understand the above information  2.  My questions regarding this information have been answered to my satisfaction.  3.  I have formulated a plan with my discharge nurse to obtain my prescribed medication for home.

## 2018-06-24 NOTE — PROGRESS NOTES
Post Partum Progress Note    Name:   Gabrielle Courtney   Date/Time:  6/24/2018 - 7:48 AM  Chief Admitting Dx:  pregnancy  Labor and delivery, indication for care  Delivery Type:  vaginal, spontaneous  Post-Op/Post Partum Days #2    Subjective:  S/p Sphenopallentine ganglion block by anesthesia which did help her ha and took it from 10/10 to 5/10. It has been offered for to have it again if she needs it and she was given info on how to get ahold of anesthesia if needed.  No other issues: nml lochia, voiding, jaylen diet.     Vitals:    06/23/18 0415 06/23/18 0814 06/23/18 2000 06/24/18 0648   BP: (!) 99/59 133/80 118/83 101/77   Pulse: 88 92 80 79   Resp: 17 18 17 16   Temp: 36.7 °C (98.1 °F) 36.9 °C (98.4 °F) 36.8 °C (98.3 °F) 36.1 °C (97 °F)   TempSrc:       SpO2: 95% 97% 97% 95%   Weight:       Height:           Exam:  abd soft nt/nd firm fundus  Ext : no calf pain dominic le    Meds:  Current Facility-Administered Medications   Medication Dose   • lidocaine viscous 2% (XYLOCAINE) solution 15 mL  15 mL   • ondansetron (ZOFRAN ODT) dispertab 4 mg  4 mg    Or   • ondansetron (ZOFRAN) syringe/vial injection 4 mg  4 mg   • oxytocin (PITOCIN) infusion (for postpartum)   mL/hr   • ibuprofen (MOTRIN) tablet 600 mg  600 mg   • acetaminophen (TYLENOL) tablet 325 mg  325 mg   • HYDROcodone-acetaminophen (NORCO) 5-325 MG per tablet 1 Tab  1 Tab   • HYDROcodone/acetaminophen (NORCO)  MG per tablet 1 Tab  1 Tab   • LR infusion     • enoxaparin (LOVENOX) inj 40 mg  40 mg   • PRN oxytocin (PITOCIN) (20 Units/1000 mL) PRN for excessive uterine bleeding - See Admin Instr  125-999 mL/hr   • miSOPROStol (CYTOTEC) tablet 600 mcg  600 mcg   • methylergonovine (METHERGINE) injection 0.2 mg  0.2 mg   • carboPROST (HEMABATE) injection 250 mcg  250 mcg   • docusate sodium (COLACE) capsule 100 mg  100 mg   • bisacodyl (DULCOLAX) suppository 10 mg  10 mg   • glycerin (adult) suppository 1 Suppository  1 Suppository   • magnesium  hydroxide (MILK OF MAGNESIA) suspension 30 mL  30 mL   • prenatal plus vitamin (STUARTNATAL 1+1) 27-1 MG tablet 1 Tab  1 Tab       Labs:   Recent Labs      06/22/18   0815  06/23/18   0556   WBC  11.9*  13.2*   RBC  4.56  3.72*   HEMOGLOBIN  11.5*  9.5*   HEMATOCRIT  36.1*  29.1*   MCV  79.2*  78.2*   MCH  25.2*  25.5*   MCHC  31.9*  32.6*   RDW  43.8  42.3   PLATELETCT  192  151*   MPV  11.6  11.4       Assessment:  Ppd 2  Plan:  Spinal HA:   -received blood patch immediately after delivery                          -Corsytropin per Anesthesia. And nasal lidocaine block. pts given info on getting that repeated if needed and  How to get ahold of anesthesia.   . h/o DVT       -Prophylactic lovenox has been resumed    Discharge home  Encourage ambulation  Pelvic rest, no heavy lifting/pulling /pushing  F/u in my office  6 weeks postpartum  Continue prenatal vitamins daily  Give Rhogam if Rh negative and indicated  Give MMR if indcated prior to discharge  Encourage breastfeeding    Jasmina Grier M.D.

## 2018-06-24 NOTE — DISCHARGE SUMMARY
DATE OF ADMISSION:  06/22/2018    DATE OF DISCHARGE:  06/24/2018    PRINCIPAL DIAGNOSES:  1.  Intrauterine pregnancy at term.  2.  Advanced maternal age.  3.  Active labor.  4.  Maternal history of thrombosis requiring anticoagulation.  5.  Status post wet tap from epidural anesthesia requiring blood patch for   spinal headache.  6.  Status post sphenopalatine ganglion block by anesthesia for her spinal   headache.    PRINCIPAL PROCEDURES:  1.  Epidural anesthesia.  2.  Blood patch.  3.  Sphenopalatine ganglion block.  4.  Normal spontaneous vaginal delivery.    HOSPITAL COURSE:  Patient arrived in labor and received epidural anesthesia.    She did have a wet tap and immediately received a blood patch; however, it did   not really help her spinal headache.  She eventually went to complete.  She   delivered a male infant, Apgars 8 and 9, weight 7 pounds 15 ounces.  Patient   has a history of DVT and was on Lovenox postpartum, she was continued on her   Lovenox and will go home and be on it daily as well secondary to her spinal   headache.  Anesthesia placed a single palatine block to help with her pain,   which did improve.  Patient is postpartum day #2 now, she will be discharged   home.  She will be given a prescription for Norco as well as Motrin.     report was checked.  She does have a history of migraines and does receive   Fioricet for her migraines.  Additionally, she did receive 10 tablets of   Percocet from Dr. Wilson recently secondary to her spinal headache.  She still   will go home with 20 tablets of Norco as is warranted, although she had   sphenopalatine block, it did not actually fixed the spinal headache and she   may have to have this sphenopalatine block repeated per anesthesia.    Additionally, she also received cosyntropin per anesthesia as well.  Despite all of   this, she will go home today and again follow up with anesthesia if she needs   to.  She has been given the information from  anesthesia on how to be reached.    She is B positive, rubella is immune, and group B strep was negative.  Her   postoperative hematocrit was 29.1.  Verbal instructions for pelvic rest and   heavy lifting, pulling, pushing.  She will resume prenatal vitamins as well.       ____________________________________     MD HUONG NGUYEN / SELENE    DD:  06/24/2018 08:35:55  DT:  06/24/2018 09:13:56    D#:  [id number]  Job#:  [id number]

## 2018-06-24 NOTE — CONSULTS
"Mother's 5th child, successfully breast fed middle daughter who is now 14 years old for 2 years, felt milk supply was \"okay but not great\", would like to build large milk supply for current infant. Did not breast feed other children. Did have breast augmentation surgery 11 years ago. Reports left breast made more milk than right breast when she breast fed her daughter, denies asymmetry or tubular breast shape prior to augmentation. Mother desires to breastfeed, supplement with formula, and pump afterward to help build milk supply. Assisted with pump use, reviewed settings and care of pump parts, provided rental pump information, parents plan to rent hospital grade pump from [facility] today, encouraged outpatient follow-up at [facility]. All questions answered, see lactation flow sheet on infant chart for more details.  "

## 2018-06-26 ENCOUNTER — HOSPITAL ENCOUNTER (OUTPATIENT)
Facility: MEDICAL CENTER | Age: 38
End: 2018-06-26
Attending: OBSTETRICS & GYNECOLOGY | Admitting: OBSTETRICS & GYNECOLOGY
Payer: COMMERCIAL

## 2018-06-26 VITALS
BODY MASS INDEX: 32.49 KG/M2 | SYSTOLIC BLOOD PRESSURE: 127 MMHG | DIASTOLIC BLOOD PRESSURE: 78 MMHG | HEART RATE: 73 BPM | TEMPERATURE: 97.9 F | HEIGHT: 67 IN | WEIGHT: 207 LBS

## 2018-06-27 NOTE — PROGRESS NOTES
2050 - pt of Dr. Wilson's accompanied by FOB and infant here for blood patch.  2100 - Dr. Irizarry at bedside, informed consent obtained.   2110 - blood patch completed, tolerated well.   2215 - Dr. Irizarry at bedside. Reports no relief with blood patch, plan to remain supine for an additional hour then discharge home.   2320 - Dr. Irizarry at bedside, still no relief noted. Plan to discharge, have pt call tomorrow if no relief of symptoms, discharge instructions given by provider.   2332 - discharged home with FOB and infant in stable walking condition.